# Patient Record
Sex: MALE | Race: WHITE
[De-identification: names, ages, dates, MRNs, and addresses within clinical notes are randomized per-mention and may not be internally consistent; named-entity substitution may affect disease eponyms.]

---

## 2019-02-15 ENCOUNTER — HOSPITAL ENCOUNTER (OUTPATIENT)
Dept: HOSPITAL 47 - LABPAT | Age: 44
End: 2019-02-15
Attending: ORTHOPAEDIC SURGERY
Payer: COMMERCIAL

## 2019-02-15 DIAGNOSIS — Z01.818: Primary | ICD-10-CM

## 2019-02-15 DIAGNOSIS — Z01.812: ICD-10-CM

## 2019-02-15 DIAGNOSIS — M51.26: ICD-10-CM

## 2019-02-15 LAB
ANION GAP SERPL CALC-SCNC: 10 MMOL/L
APTT BLD: 24.7 SEC (ref 22–30)
BASOPHILS # BLD AUTO: 0 K/UL (ref 0–0.2)
BASOPHILS NFR BLD AUTO: 1 %
BUN SERPL-SCNC: 25 MG/DL (ref 9–20)
CALCIUM SPEC-MCNC: 9.9 MG/DL (ref 8.4–10.2)
CHLORIDE SERPL-SCNC: 103 MMOL/L (ref 98–107)
CO2 SERPL-SCNC: 28 MMOL/L (ref 22–30)
EOSINOPHIL # BLD AUTO: 0.1 K/UL (ref 0–0.7)
EOSINOPHIL NFR BLD AUTO: 3 %
ERYTHROCYTE [DISTWIDTH] IN BLOOD BY AUTOMATED COUNT: 5.12 M/UL (ref 4.3–5.9)
ERYTHROCYTE [DISTWIDTH] IN BLOOD: 12.6 % (ref 11.5–15.5)
GLUCOSE SERPL-MCNC: 87 MG/DL (ref 74–99)
HCT VFR BLD AUTO: 46.4 % (ref 39–53)
HGB BLD-MCNC: 15.2 GM/DL (ref 13–17.5)
INR PPP: 0.9 (ref ?–1.2)
LYMPHOCYTES # SPEC AUTO: 1.1 K/UL (ref 1–4.8)
LYMPHOCYTES NFR SPEC AUTO: 23 %
MCH RBC QN AUTO: 29.6 PG (ref 25–35)
MCHC RBC AUTO-ENTMCNC: 32.7 G/DL (ref 31–37)
MCV RBC AUTO: 90.7 FL (ref 80–100)
MONOCYTES # BLD AUTO: 0.4 K/UL (ref 0–1)
MONOCYTES NFR BLD AUTO: 8 %
NEUTROPHILS # BLD AUTO: 3.1 K/UL (ref 1.3–7.7)
NEUTROPHILS NFR BLD AUTO: 64 %
PH UR: 5 [PH] (ref 5–8)
PLATELET # BLD AUTO: 333 K/UL (ref 150–450)
POTASSIUM SERPL-SCNC: 4.4 MMOL/L (ref 3.5–5.1)
PT BLD: 9.9 SEC (ref 9–12)
SODIUM SERPL-SCNC: 141 MMOL/L (ref 137–145)
SP GR UR: 1.02 (ref 1–1.03)
UROBILINOGEN UR QL STRIP: <2 MG/DL (ref ?–2)
WBC # BLD AUTO: 4.8 K/UL (ref 3.8–10.6)

## 2019-02-15 PROCEDURE — 85730 THROMBOPLASTIN TIME PARTIAL: CPT

## 2019-02-15 PROCEDURE — 36415 COLL VENOUS BLD VENIPUNCTURE: CPT

## 2019-02-15 PROCEDURE — 93005 ELECTROCARDIOGRAM TRACING: CPT

## 2019-02-15 PROCEDURE — 80048 BASIC METABOLIC PNL TOTAL CA: CPT

## 2019-02-15 PROCEDURE — 85610 PROTHROMBIN TIME: CPT

## 2019-02-15 PROCEDURE — 85025 COMPLETE CBC W/AUTO DIFF WBC: CPT

## 2019-02-15 PROCEDURE — 81003 URINALYSIS AUTO W/O SCOPE: CPT

## 2019-02-15 PROCEDURE — 71046 X-RAY EXAM CHEST 2 VIEWS: CPT

## 2019-02-15 NOTE — XR
EXAMINATION TYPE: XR chest 2V

 

DATE OF EXAM: 2/15/2019

 

COMPARISON: NONE

 

HISTORY: Preop

 

TECHNIQUE:  Frontal and lateral views of the chest are obtained.

 

FINDINGS:  There is no focal air space opacity, pleural effusion, or pneumothorax seen.  The cardiac 
silhouette size is within normal limits.   The osseous structures are intact.

 

IMPRESSION:  No acute cardiopulmonary process.

## 2019-02-25 ENCOUNTER — HOSPITAL ENCOUNTER (OUTPATIENT)
Dept: HOSPITAL 47 - OR | Age: 44
Discharge: HOME | End: 2019-02-25
Attending: ORTHOPAEDIC SURGERY
Payer: COMMERCIAL

## 2019-02-25 VITALS — DIASTOLIC BLOOD PRESSURE: 79 MMHG | SYSTOLIC BLOOD PRESSURE: 119 MMHG | HEART RATE: 75 BPM

## 2019-02-25 VITALS — TEMPERATURE: 97.2 F

## 2019-02-25 VITALS — BODY MASS INDEX: 20.2 KG/M2

## 2019-02-25 VITALS — RESPIRATION RATE: 16 BRPM

## 2019-02-25 DIAGNOSIS — Z79.52: ICD-10-CM

## 2019-02-25 DIAGNOSIS — M43.16: ICD-10-CM

## 2019-02-25 DIAGNOSIS — Z79.1: ICD-10-CM

## 2019-02-25 DIAGNOSIS — Z79.891: ICD-10-CM

## 2019-02-25 DIAGNOSIS — M51.16: Primary | ICD-10-CM

## 2019-02-25 DIAGNOSIS — M47.27: ICD-10-CM

## 2019-02-25 DIAGNOSIS — Z79.899: ICD-10-CM

## 2019-02-25 PROCEDURE — 63030 LAMOT DCMPRN NRV RT 1 LMBR: CPT

## 2019-02-25 PROCEDURE — 72020 X-RAY EXAM OF SPINE 1 VIEW: CPT

## 2019-02-25 PROCEDURE — 63035 LAMOT DCMPRN NRV RT EA ADDL: CPT

## 2019-02-25 PROCEDURE — 86901 BLOOD TYPING SEROLOGIC RH(D): CPT

## 2019-02-25 PROCEDURE — 86900 BLOOD TYPING SEROLOGIC ABO: CPT

## 2019-02-25 PROCEDURE — 86850 RBC ANTIBODY SCREEN: CPT

## 2019-02-25 NOTE — P.OP
Date of Procedure: 02/25/19


Preoperative Diagnosis: 


Far lateral disc herniation L3 4 L4 5, degenerative disc disease, left lower 

extremity radiculopathy, left lower extremity weakness


Postoperative Diagnosis: 


Same


Anesthesia: GETA


Pathology: none sent


Condition: stable


Disposition: PACU


Description of Procedure: 


BRIEF OPERATIVE NOTE


Preoperative Diagnosis:Far lateral disc herniation L3 4 L4 5, degenerative disc 

disease, left lower extremity radiculopathy, left lower extremity weakness


Postoperative Diagnosis:Far lateral disc herniation L3 4 L4 5, degenerative 

disc disease, left lower extremity radiculopathy, left lower extremity weakness


Procedure: Laminectomy and decompression L3 4 L4 5 on the left


                  Discectomy for decompression L3 4 L4 5 far lateral


Surgeon: Dr. Palacio


Assistant: Nitin WHITFIELD who is present throughout the entire the case 

persistence during positioning, dissection, exposure, visualization, and all 

crucial elements of the case as well as closure.


Anesthesia: General anesthesia per Dr. Law


Estimated blood loss: Approximately 20 mL


Complications: None apparent


Components implanted: None


Disposition: To recovery room in good stable condition.





OPERATIVE INDICATIONS





The patient has been having issues in their lower back and lower extremities.  

He is having severe left lower extremity radicular symptoms with some weakness 

over his thigh and dorsiflexion.  His right leg was doing well.  He was found 

have a far lateral disc herniation at L3 4 and L4 5 which quite well with his 

low back and lower extremity symptoms.  He had been through extensive 

conservative treatment including epidural steroid injections and therapy.  

Despite this he is not having any prolonged benefit.  The patient has been 

through conservative treatment.  We discussed various treatment options 

including surgery, and the patient wishes to proceed with surgery We discussed 

the risk, patient's alternatives and benefits of surgery including but not 

limited to, risk of bleeding risk of infection, risk of need for further surgery

, risk of decreased, loss of motion, loss of function, nerve damage, paralysis, 

heart attack, blindness and death.





OPERATIVE SUMMARY





After discussing all the risks, patient alternatives and benefits at length, 

the patient elected to proceed with surgical intervention, signed informed 

consent, and presented for their procedure.  The patient was seen and examined 

in the preoperative holding area and the surgical site was marked.  The patient 

was given antibiotics and brought to the operating room.





The patient was sedated and intubated by anesthesia in standard fashion.  The 

patient was positioned on to the operating room table in a prone position on 

the appropriate frame which was well-padded and well molded.  We were careful 

to pad any bony prominences and pressure points. We were careful to maintain 

the patient's cervical spine and good neutral alignment and position throughout.





The patient was prepped and draped in a normal standard fashion.  An 

appropriate timeout and keystone protocol performed.  We were able to proceed 

with the surgery.  Fluoroscopy was utilized to establish the appropriate level.

  The local wound area was infiltrated with local anesthetic. 





An incision was made at the midline longitudinally over the appropriate levels 

at L3 4 and 5.  Dissection was taken down subcutaneously to the level of the 

fascia which was split midline.  Dissection was taken over the lamina. 

Intraoperative fluoroscopy was taken which showed a marker at the appropriate 

level at L4 5.  With the appropriate level positively confirmed, we were able 

to proceed with laminectomy, starting at L45 and then moving to L3 4.  The 

wound was copiously irrigated and suctioned dry as had been done periodically 

throughout the case.  I performed a laminectomy with a combination of curettes 

and a high-speed bur and Kerrison rongeurs.  A small medial facetectomy was 

performed again further access.  A partial foraminotomy was also performed.  

Portions of the ligamentum flavum were taken down to expose the dura and 

traversing nerve root.  I was able to mobilize the traversing nerve root and 

gain access to the disc space.  Note was made of obvious compression from the 

disc at both L3 4 and L4 5 and more significantly at L3 4.  Protecting the soft 

tissue structures, a small annulotomy was established.  I was able to perform 

discectomy and remove any extruded disc fragments and any loose fragments from 

within the disc itself.  I worked to the far lateral space and the 

transforaminal space to remove the far lateral disc herniation.  There is some 

disc desiccation noted.  I tried to preserve the disc annulus that appeared 

stable.  There were no further extruded fragments noted.  This was done at L4 5 

and then at L3 4.  There is no evidence of dural tear or leak.  Good hemostasis 

maintained.  The wound was copiously irrigated and suctioned dry.


Good decompression and discectomy was noted.





We were able to proceed with closure. The fascia was closed for a watertight 

closure.  The subcuticular tissue was closed with absorbable suture.  The wound 

was cleaned and dried and dressed with the appropriate dressing.  The drapes 

were broken down.  The patient was gently rolled back onto their hospital bed 

being careful to maintain their cervical spine and good neutral alignment and 

position.  They were woken up by anesthesia, extubated, and brought to the 

recovery room in good stable condition.





The patient will be admitted to the hospital for observation and for 

appropriate postoperative care, medical management and monitoring.  We will 

continue to follow them closely about the postoperative course.

## 2019-02-26 NOTE — FL
Fluoroscopy

 

HISTORY: Needle placement

 

3 seconds fluoroscopy time supplied to the referring clinician.  1 intraoperative C-arm images docume
nt the procedure. See dictated report from orthopedic surgery.

## 2019-02-26 NOTE — XR
Limited lumbar spine

 

HISTORY: Needle placement

 

Intraoperative C-arm image documents the procedure.